# Patient Record
Sex: FEMALE | Race: WHITE
[De-identification: names, ages, dates, MRNs, and addresses within clinical notes are randomized per-mention and may not be internally consistent; named-entity substitution may affect disease eponyms.]

---

## 2020-02-03 PROBLEM — Z00.00 ENCOUNTER FOR PREVENTIVE HEALTH EXAMINATION: Noted: 2020-02-03

## 2020-02-06 ENCOUNTER — LABORATORY RESULT (OUTPATIENT)
Age: 50
End: 2020-02-06

## 2020-02-06 ENCOUNTER — APPOINTMENT (OUTPATIENT)
Dept: RHEUMATOLOGY | Facility: CLINIC | Age: 50
End: 2020-02-06
Payer: COMMERCIAL

## 2020-02-06 VITALS
WEIGHT: 150 LBS | SYSTOLIC BLOOD PRESSURE: 122 MMHG | HEART RATE: 67 BPM | DIASTOLIC BLOOD PRESSURE: 80 MMHG | OXYGEN SATURATION: 96 % | HEIGHT: 70 IN | BODY MASS INDEX: 21.47 KG/M2 | TEMPERATURE: 98.6 F

## 2020-02-06 DIAGNOSIS — Z78.9 OTHER SPECIFIED HEALTH STATUS: ICD-10-CM

## 2020-02-06 DIAGNOSIS — M25.541 PAIN IN JOINTS OF RIGHT HAND: ICD-10-CM

## 2020-02-06 DIAGNOSIS — Z87.891 PERSONAL HISTORY OF NICOTINE DEPENDENCE: ICD-10-CM

## 2020-02-06 DIAGNOSIS — M25.539 PAIN IN UNSPECIFIED WRIST: ICD-10-CM

## 2020-02-06 DIAGNOSIS — K11.7 DISTURBANCES OF SALIVARY SECRETION: ICD-10-CM

## 2020-02-06 DIAGNOSIS — E50.7 OTHER OCULAR MANIFESTATIONS OF VITAMIN A DEFICIENCY: ICD-10-CM

## 2020-02-06 DIAGNOSIS — M25.569 PAIN IN UNSPECIFIED KNEE: ICD-10-CM

## 2020-02-06 DIAGNOSIS — M54.2 CERVICALGIA: ICD-10-CM

## 2020-02-06 DIAGNOSIS — M25.579 PAIN IN UNSPECIFIED ANKLE AND JOINTS OF UNSPECIFIED FOOT: ICD-10-CM

## 2020-02-06 DIAGNOSIS — Z87.19 PERSONAL HISTORY OF OTHER DISEASES OF THE DIGESTIVE SYSTEM: ICD-10-CM

## 2020-02-06 DIAGNOSIS — K59.00 CONSTIPATION, UNSPECIFIED: ICD-10-CM

## 2020-02-06 DIAGNOSIS — M54.9 DORSALGIA, UNSPECIFIED: ICD-10-CM

## 2020-02-06 DIAGNOSIS — G62.9 POLYNEUROPATHY, UNSPECIFIED: ICD-10-CM

## 2020-02-06 DIAGNOSIS — M25.60 STIFFNESS OF UNSPECIFIED JOINT, NOT ELSEWHERE CLASSIFIED: ICD-10-CM

## 2020-02-06 DIAGNOSIS — M25.542 PAIN IN JOINTS OF RIGHT HAND: ICD-10-CM

## 2020-02-06 DIAGNOSIS — I73.00 RAYNAUD'S SYNDROME W/OUT GANGRENE: ICD-10-CM

## 2020-02-06 DIAGNOSIS — M25.559 PAIN IN UNSPECIFIED HIP: ICD-10-CM

## 2020-02-06 PROCEDURE — 99205 OFFICE O/P NEW HI 60 MIN: CPT | Mod: 25

## 2020-02-06 PROCEDURE — 36415 COLL VENOUS BLD VENIPUNCTURE: CPT

## 2020-02-06 RX ORDER — BRIMONIDINE TARTRATE, TIMOLOL MALEATE 2; 5 MG/ML; MG/ML
SOLUTION/ DROPS OPHTHALMIC
Refills: 0 | Status: ACTIVE | COMMUNITY

## 2020-02-06 RX ORDER — LATANOPROST/PF 0.005 %
DROPS OPHTHALMIC (EYE)
Refills: 0 | Status: ACTIVE | COMMUNITY

## 2020-03-06 LAB
24R-OH-CALCIDIOL SERPL-MCNC: 81.6 PG/ML
25(OH)D3 SERPL-MCNC: 25 NG/ML
ACE BLD-CCNC: 38 U/L
ALP BONE SERPL-MCNC: 9 MCG/L
CA-I SERPL-SCNC: 1.25 MMOL/L
CHROMATIN AB SERPL-ACNC: <0.2 AL
EJ AB SER QL: NEGATIVE
ENA JO1 AB SER IA-ACNC: <0.2 AL
ENA JO1 AB SER IA-ACNC: <20 UNITS
ENA PM/SCL AB SER-ACNC: <20 UNITS
ENA SM+RNP AB SER IA-ACNC: <20 UNITS
ENA SS-A IGG SER QL: <20 UNITS
FIBRILLARIN AB SER QL: NEGATIVE
IL2 SERPL-MCNC: <31.2 PG/ML
IL6 SERPL-MCNC: <0.7 PG/ML
INTERLEUKIN 1-BETA: < 3.9 PG/ML
KU AB SER QL: NEGATIVE
LDH SERPL-CCNC: 167 U/L
LUPUS ANTICOAGULANT CASCADE REFLEX: NORMAL
MDA-5 (P140)(CADM-140): <20 UNITS
MI2 AB SER QL: NEGATIVE
MISCELLANEOUS TEST: NORMAL
NXP-2 (P140): <20 UNITS
OJ AB SER QL: NEGATIVE
PL12 AB SER QL: NEGATIVE
PL7 AB SER QL: NEGATIVE
PROC NAME: NORMAL
SRP AB SERPL QL: NEGATIVE
TIF GAMMA (P155/140): <20 UNITS
U2 SNRNP AB SER QL: NEGATIVE

## 2020-10-12 ENCOUNTER — APPOINTMENT (OUTPATIENT)
Dept: NEUROLOGY | Facility: CLINIC | Age: 50
End: 2020-10-12
Payer: COMMERCIAL

## 2020-10-12 VITALS
HEART RATE: 68 BPM | TEMPERATURE: 98.2 F | SYSTOLIC BLOOD PRESSURE: 100 MMHG | WEIGHT: 145 LBS | DIASTOLIC BLOOD PRESSURE: 66 MMHG | BODY MASS INDEX: 20.76 KG/M2 | HEIGHT: 70 IN

## 2020-10-12 DIAGNOSIS — R20.0 ANESTHESIA OF SKIN: ICD-10-CM

## 2020-10-12 DIAGNOSIS — R20.2 ANESTHESIA OF SKIN: ICD-10-CM

## 2020-10-12 DIAGNOSIS — Z98.1 ARTHRODESIS STATUS: ICD-10-CM

## 2020-10-12 DIAGNOSIS — G60.9 HEREDITARY AND IDIOPATHIC NEUROPATHY, UNSPECIFIED: ICD-10-CM

## 2020-10-12 PROCEDURE — 99204 OFFICE O/P NEW MOD 45 MIN: CPT

## 2020-10-12 RX ORDER — BRINZOLAMIDE 10 MG/ML
1 SUSPENSION/ DROPS OPHTHALMIC
Refills: 0 | Status: ACTIVE | COMMUNITY

## 2020-10-12 NOTE — HISTORY OF PRESENT ILLNESS
[FreeTextEntry1] : She is a 50-year-old patient coming here for evaluation for bilateral upper and lower extremity paresthesias numbness with the diagnosis of idiopathic peripheral neuropathy under care of Dr. Ferguson in [place] [facility] until this year had extensive workup including MRI scan brain, cervical spine, EMG/NCV studies, small nerve fiber skin biopsy, per patient all of them were negative except EMG/NCV study reported bilateral carpal syndrome. No outpatient records available except labwork positive for free K+L light chains positive subsequently seen by hematology, rheumatologists.\par Patient tried on gabapentin blood does not want to continue medication as patient and afraid of getting addicted to the medication. Persistent symptoms of neuropathy and bilateral leg cramps each bothering her currently.\par Patient exercises regularly and walks on a daily basis. Mild imbalance noted. No focal weakness. Denies of any other complaints except for Raynaud's phenomena dry mouth and history of glaucoma. Seen by Dr. Tucker and recommended neurology opinion.

## 2020-10-12 NOTE — DISCUSSION/SUMMARY
[FreeTextEntry1] : She is 50-year-old patient with the symptoms of sensory peripheral neuropathy no reports available for evaluation except for lab work.\par Patient being followed by rheumatology and outside neurologist extensive workup done including MRI scans and EMG/NCV studies x2 including skin biopsy. Clinical examination suggests peripheral neuropathy.\par Patient does not want any repeat testing at this time.\par Discussed with patient symptomatic treatment with gabapentin which patient is willing to try at this time.\par Follow up with  rheumatology as needed.\par Followup with Dr. Ferguson or return for followup evaluation here if you decide to follow up in this office.\par Get copies of medical records from Dr. Ferguson 's office.\par Followup with you for other medical problems.

## 2020-10-12 NOTE — PHYSICAL EXAM
[General Appearance - Alert] : alert [General Appearance - In No Acute Distress] : in no acute distress [Oriented To Time, Place, And Person] : oriented to person, place, and time [Impaired Insight] : insight and judgment were intact [Affect] : the affect was normal [Person] : oriented to person [Place] : oriented to place [Time] : oriented to time [Concentration Intact] : normal concentrating ability [Visual Intact] : visual attention was ~T not ~L decreased [Naming Objects] : no difficulty naming common objects [Repeating Phrases] : no difficulty repeating a phrase [Writing A Sentence] : no difficulty writing a sentence [Fluency] : fluency intact [Comprehension] : comprehension intact [Reading] : reading intact [Past History] : adequate knowledge of personal past history [Cranial Nerves Optic (II)] : visual acuity intact bilaterally,  visual fields full to confrontation, pupils equal round and reactive to light [Cranial Nerves Oculomotor (III)] : extraocular motion intact [Cranial Nerves Trigeminal (V)] : facial sensation intact symmetrically [Cranial Nerves Facial (VII)] : face symmetrical [Cranial Nerves Vestibulocochlear (VIII)] : hearing was intact bilaterally [Cranial Nerves Glossopharyngeal (IX)] : tongue and palate midline [Cranial Nerves Accessory (XI - Cranial And Spinal)] : head turning and shoulder shrug symmetric [Cranial Nerves Hypoglossal (XII)] : there was no tongue deviation with protrusion [Motor Strength] : muscle strength was normal in all four extremities [No Muscle Atrophy] : normal bulk in all four extremities [Romberg's Sign] : a positive Romberg's sign was present [Limited Balance] : the patient's balance was impaired [Past-pointing] : there was no past-pointing [Tremor] : no tremor present [1+] : Triceps left 1+ [0] : Ankle jerk left 0 [Plantar Reflex Right Only] : normal on the right [Plantar Reflex Left Only] : normal on the left [FreeTextEntry7] : Distal sensory loss with Vibration [FreeTextEntry8] : Mild unsteady on tandem walking [Sclera] : the sclera and conjunctiva were normal [PERRL With Normal Accommodation] : pupils were equal in size, round, reactive to light, with normal accommodation [Extraocular Movements] : extraocular movements were intact [Outer Ear] : the ears and nose were normal in appearance [Oropharynx] : the oropharynx was normal [Neck Appearance] : the appearance of the neck was normal [Neck Cervical Mass (___cm)] : no neck mass was observed [Jugular Venous Distention Increased] : there was no jugular-venous distention [Thyroid Diffuse Enlargement] : the thyroid was not enlarged [Thyroid Nodule] : there were no palpable thyroid nodules [Auscultation Breath Sounds / Voice Sounds] : lungs were clear to auscultation bilaterally [Heart Rate And Rhythm] : heart rate was normal and rhythm regular [Heart Sounds] : normal S1 and S2 [Heart Sounds Gallop] : no gallops [Murmurs] : no murmurs [Heart Sounds Pericardial Friction Rub] : no pericardial rub [Full Pulse] : the pedal pulses are present [Edema] : there was no peripheral edema [Bowel Sounds] : normal bowel sounds [Abdomen Soft] : soft [Abdomen Tenderness] : non-tender [Abdomen Mass (___ Cm)] : no abdominal mass palpated [No CVA Tenderness] : no ~M costovertebral angle tenderness [No Spinal Tenderness] : no spinal tenderness [Nail Clubbing] : no clubbing  or cyanosis of the fingernails [Musculoskeletal - Swelling] : no joint swelling seen [Motor Tone] : muscle strength and tone were normal [FreeTextEntry1] : Mild unsteady [Skin Color & Pigmentation] : normal skin color and pigmentation [Skin Turgor] : normal skin turgor [] : no rash

## 2020-10-12 NOTE — REVIEW OF SYSTEMS
[Numbness] : numbness [Tingling] : tingling [Eyesight Problems] : eyesight problems [Dry Eyes] : dryness of the eyes [Constipation] : constipation [Negative] : Heme/Lymph [de-identified] : Muscle cramps

## 2020-10-12 NOTE — REASON FOR VISIT
[Consultation] : a consultation visit [FreeTextEntry1] : Evaluation for Neuropathy with Bilateral UE/ LE paresthesias and numbness with  random shocks through out the body with extensive testing done by Dr. Ferguson at [facility]

## 2021-09-14 ENCOUNTER — APPOINTMENT (OUTPATIENT)
Dept: OBGYN | Facility: CLINIC | Age: 51
End: 2021-09-14
Payer: COMMERCIAL

## 2021-09-14 ENCOUNTER — NON-APPOINTMENT (OUTPATIENT)
Age: 51
End: 2021-09-14

## 2021-09-14 ENCOUNTER — APPOINTMENT (OUTPATIENT)
Dept: OBGYN | Facility: CLINIC | Age: 51
End: 2021-09-14

## 2021-09-14 VITALS
DIASTOLIC BLOOD PRESSURE: 64 MMHG | WEIGHT: 145 LBS | HEIGHT: 70 IN | SYSTOLIC BLOOD PRESSURE: 116 MMHG | BODY MASS INDEX: 20.76 KG/M2

## 2021-09-14 DIAGNOSIS — Z82.49 FAMILY HISTORY OF ISCHEMIC HEART DISEASE AND OTHER DISEASES OF THE CIRCULATORY SYSTEM: ICD-10-CM

## 2021-09-14 DIAGNOSIS — Z86.018 PERSONAL HISTORY OF OTHER BENIGN NEOPLASM: ICD-10-CM

## 2021-09-14 DIAGNOSIS — Z83.3 FAMILY HISTORY OF DIABETES MELLITUS: ICD-10-CM

## 2021-09-14 DIAGNOSIS — Z85.828 PERSONAL HISTORY OF OTHER MALIGNANT NEOPLASM OF SKIN: ICD-10-CM

## 2021-09-14 DIAGNOSIS — N95.1 MENOPAUSAL AND FEMALE CLIMACTERIC STATES: ICD-10-CM

## 2021-09-14 DIAGNOSIS — Z00.00 ENCOUNTER FOR GENERAL ADULT MEDICAL EXAMINATION W/OUT ABNORMAL FINDINGS: ICD-10-CM

## 2021-09-14 DIAGNOSIS — Z80.0 FAMILY HISTORY OF MALIGNANT NEOPLASM OF DIGESTIVE ORGANS: ICD-10-CM

## 2021-09-14 DIAGNOSIS — Z72.89 OTHER PROBLEMS RELATED TO LIFESTYLE: ICD-10-CM

## 2021-09-14 DIAGNOSIS — Z86.69 PERSONAL HISTORY OF OTHER DISEASES OF THE NERVOUS SYSTEM AND SENSE ORGANS: ICD-10-CM

## 2021-09-14 PROCEDURE — 99212 OFFICE O/P EST SF 10 MIN: CPT | Mod: 25

## 2021-09-14 PROCEDURE — 99386 PREV VISIT NEW AGE 40-64: CPT

## 2021-09-14 RX ORDER — GABAPENTIN 100 MG/1
100 CAPSULE ORAL
Refills: 0 | Status: ACTIVE | COMMUNITY

## 2021-09-14 RX ORDER — ERGOCALCIFEROL 1.25 MG/1
1.25 MG CAPSULE, LIQUID FILLED ORAL
Refills: 0 | Status: ACTIVE | COMMUNITY

## 2021-09-14 RX ORDER — BACILLUS COAGULANS/INULIN 1B-250 MG
CAPSULE ORAL
Refills: 0 | Status: ACTIVE | COMMUNITY

## 2021-09-21 ENCOUNTER — APPOINTMENT (OUTPATIENT)
Dept: OBGYN | Facility: CLINIC | Age: 51
End: 2021-09-21

## 2021-11-03 NOTE — PHYSICAL EXAM
[Appropriately responsive] : appropriately responsive [Alert] : alert [No Acute Distress] : no acute distress [No Lymphadenopathy] : no lymphadenopathy [Regular Rate Rhythm] : regular rate rhythm [No Murmurs] : no murmurs [Clear to Auscultation B/L] : clear to auscultation bilaterally [Soft] : soft [Non-tender] : non-tender [Non-distended] : non-distended [No HSM] : No HSM [No Lesions] : no lesions [No Mass] : no mass [Oriented x3] : oriented x3 [Examination Of The Breasts] : a normal appearance [No Masses] : no breast masses were palpable [Labia Majora] : normal [Labia Minora] : normal [Normal] : normal [Absent] : absent [Uterine Adnexae] : normal [Declined] : Patient declined rectal exam [FreeTextEntry4] : cuff intact

## 2021-11-03 NOTE — DISCUSSION/SUMMARY
[FreeTextEntry1] : 49yo G0 s/p MARIANA/BS for fibroids with hot flushes.  She is already Rx'd Gabapentin and will start taking it as Rx'd.  \par \par Hot fluhses: \par - Will take Gabapentin 100mg PO TID (as Rx'd). If increase is needed to 300mg PO daily will d/w Neuro first \par - RTO in 3 months for follow up and re-evaluation \par \par RHM: \par - DVS neg x 3\par - Dentist, PCP, Derm as discussed \par - Rx given for mammo/sono\par - Colonoscopy as discussed \par - Offered STI screen today. Pt declined \par - Encouraged healthy diet, exercise, weight maint. \par \par Arabella Mukherjee MD \par Obstetrician/Gynecologist\par \par \par \par

## 2021-11-03 NOTE — HISTORY OF PRESENT ILLNESS
[Patient reported mammogram was normal] : Patient reported mammogram was normal [Patient reported PAP Smear was abnormal] : Patient reported PAP Smear was abnormal [FreeTextEntry1] : 51yo G0 s/p MARIANA/BS for fibroids here for annual and to discuss her hot flushes. Intermittent for month at at time. Drinks 4-5 glasses of wine weekly but does not note any changes in her hot flushes. No changes with caffeine either. Has tried OTC meds which help intermittently.   [Mammogramdate] : 2019  [PapSmeardate] : 2017  [TextBox_31] : was told it was abnl but then they realized she did not have a cervix  [BoneDensityDate] : N/A [ColonoscopyDate] : N/A

## 2021-12-14 ENCOUNTER — APPOINTMENT (OUTPATIENT)
Dept: OBGYN | Facility: CLINIC | Age: 51
End: 2021-12-14

## 2022-11-08 ENCOUNTER — TRANSCRIPTION ENCOUNTER (OUTPATIENT)
Age: 52
End: 2022-11-08

## 2022-11-08 ENCOUNTER — APPOINTMENT (OUTPATIENT)
Dept: OBGYN | Facility: CLINIC | Age: 52
End: 2022-11-08

## 2022-11-08 ENCOUNTER — LABORATORY RESULT (OUTPATIENT)
Age: 52
End: 2022-11-08

## 2022-11-08 VITALS
BODY MASS INDEX: 21.47 KG/M2 | WEIGHT: 150 LBS | SYSTOLIC BLOOD PRESSURE: 110 MMHG | DIASTOLIC BLOOD PRESSURE: 66 MMHG | HEIGHT: 70 IN

## 2022-11-08 PROCEDURE — 99212 OFFICE O/P EST SF 10 MIN: CPT | Mod: 25

## 2022-11-08 PROCEDURE — 99396 PREV VISIT EST AGE 40-64: CPT

## 2022-11-08 NOTE — HISTORY OF PRESENT ILLNESS
[Patient reported mammogram was normal] : Patient reported mammogram was normal [Patient reported breast sonogram was normal] : Patient reported breast sonogram was normal [Patient reported colonoscopy was normal] : Patient reported colonoscopy was normal [FreeTextEntry1] : 51yo G0 s/p MARIANA/BS for fibroids here for annual. She says her hot flushes remain. She is taking Esequiel 300mg PO qhS  and 100mg in AM because she does not want it to make her sleepy.  \par \par Burning with urination and reports having issues with bladder emptying but has not seen urologist.  [Mammogramdate] : 2021 [BreastSonogramDate] : 2021 [ColonoscopyDate] : 2022

## 2022-11-14 LAB
APPEARANCE: CLEAR
BACTERIA UR CULT: NORMAL
BILIRUBIN URINE: NEGATIVE
BLOOD URINE: NEGATIVE
CANDIDA VAG CYTO: NOT DETECTED
COLOR: COLORLESS
G VAGINALIS+PREV SP MTYP VAG QL MICRO: NOT DETECTED
GLUCOSE QUALITATIVE U: NEGATIVE
KETONES URINE: NEGATIVE
LEUKOCYTE ESTERASE URINE: ABNORMAL
NITRITE URINE: NEGATIVE
PH URINE: 7
PROTEIN URINE: NEGATIVE
SPECIFIC GRAVITY URINE: 1
T VAGINALIS VAG QL WET PREP: NOT DETECTED
URINE CYTOLOGY: NORMAL
UROBILINOGEN URINE: NORMAL

## 2023-02-09 ENCOUNTER — APPOINTMENT (OUTPATIENT)
Dept: UROGYNECOLOGY | Facility: CLINIC | Age: 53
End: 2023-02-09
Payer: COMMERCIAL

## 2023-02-09 VITALS
SYSTOLIC BLOOD PRESSURE: 115 MMHG | DIASTOLIC BLOOD PRESSURE: 70 MMHG | WEIGHT: 150 LBS | BODY MASS INDEX: 21.47 KG/M2 | HEIGHT: 70 IN

## 2023-02-09 DIAGNOSIS — R39.11 HESITANCY OF MICTURITION: ICD-10-CM

## 2023-02-09 DIAGNOSIS — R39.15 URGENCY OF URINATION: ICD-10-CM

## 2023-02-09 DIAGNOSIS — N95.2 POSTMENOPAUSAL ATROPHIC VAGINITIS: ICD-10-CM

## 2023-02-09 DIAGNOSIS — K59.09 OTHER CONSTIPATION: ICD-10-CM

## 2023-02-09 PROCEDURE — 99204 OFFICE O/P NEW MOD 45 MIN: CPT

## 2023-02-09 RX ORDER — ESTRADIOL 0.1 MG/G
0.1 CREAM VAGINAL
Qty: 1 | Refills: 3 | Status: ACTIVE | COMMUNITY
Start: 2023-02-09 | End: 1900-01-01

## 2023-02-09 NOTE — PHYSICAL EXAM
[Chaperone Present] : A chaperone was present in the examining room during all aspects of the physical examination [FreeTextEntry1] : General: Not in acute distress, alert and oriented x3.\par Neck: Supple. No lymphadenopathy. \par Abdomen: Soft, nontender, and nondistended. No obvious hepatosplenomegaly. No obvious hernias. A well-healed small transverse skin incision of previous laparotomy for hysterectomy\par Pelvic Exam: Normal external female genitalia. Saddle sensory exam S2 to S4 is intact. Perineal reflexes not visualized. Urethra is well supported without prolapse, exudates, or lesions. Cough stress test is negative. Post void residual was checked with I/O cath and was 70 cc of clear urine. Pale and atrophic-appearing vaginal epithelium. No vaginal blood.  Yellowish-brown vaginal discharge noted in the vagina.. Cervix and uterus surgically absent. Bimanual exam: No palpable adnexal masses or tenderness.  All vaginal compartments are well supported\par \par

## 2023-02-09 NOTE — DISCUSSION/SUMMARY
[FreeTextEntry1] : The patient was counseled regarding the pathophysiology of the above conditions. A total of approximately 60 minutes was spent on this visit, greater than 50%of which was spent on counseling. She was also counseled regarding the risks, benefits, indications, and alternatives of further evaluations studies, as well as various management options. She was given verbal and written information/education on pelvic floor muscle exercises, avoidance of dietary bladder irritants, and other strategies to improve bladder control.  After a detailed discussion, following management plan was outlined:\par 1. 12 weeks trial of fluid modification and bladder retraining. she will start the home exercise program as instructed.\par 2. UA with micro and urine culture was ordered to exclude UTI.\par 3.  Night time fluid restriction and other strategies to decrease nocturia were reviewed.\par 4. Discussed importance of maintaining optimal glycemic control in for bladder health.\par 5.  Discussed the importance of improving bowel consistency and regularity with bowel retraining and daily intake of fiber supplement.  She verbalized understanding.  \par 6.  Offered her a trial of vaginal estrogen supplementation due to presence of moderate vaginal atrophy.  She has no history of breast cancer and no other contraindication noted.  Prescription for vaginal estrogen cream sent to her pharmacy.  Also discussed use of organic coconut oil as a vaginal moisturizer and lubricant. \par 7.  F/u in  3 months. If she hasn't felt satisfactory improvement with above, will consider further evaluation with urodynamic testing. .

## 2023-02-09 NOTE — ASSESSMENT
[FreeTextEntry1] : Patient is a 52-year-old multipara with urinary hesitancy, weak stream, urinary urgency, chronic constipation and atrophic vaginitis. On examination, there is no evidence of urethral hypermobility with a negative empty bladder supine cough stress test, and normal postvoid residual. She has no history of recurrent UTI. She admits to consumption of few bladder irritants. Other potential contributing factors include history of L5-S1 laminectomy and prediabetes\par \par

## 2023-02-09 NOTE — HISTORY OF PRESENT ILLNESS
[FreeTextEntry1] : Patient is a 52-year-old G0 s/p MARIANA for fibroids who is referred by Dr. Arabella Monique for evaluation and management of voiding difficulty\par Patient reports difficulty emptying of bladder for past 1 year. She reports hesitancy, occasional sensation of incomplete bladder emptying, interrupted urinary stream. She reports pain in lower abdomen/ pelvis while urinating and occasional burning with urination \par Nocturia: 0\par Daytime frequency: q 2 hr\par Hx of bladder infection  2 years ago which h was postcoital \par Daily fluid intake: 64 oz of water (adds cranberry extracts sometimes)-, 24-32 oz of coffee, 8 oz of tea, 8 oz of cranberry/ orange juice \par Bowel symptoms: chronic constipation . Denies fecal incontinence \par \par GYN Hx: MARIANA in 2009 for fibroid. She is sexually inactive.  Denies history of abnormal Pap smears.  Denies history of abnormal mammograms.\par \par PMHx: Peripheral neuropathy, normal pressure glaucoma, impaired glucose tolerance, Raynauds \par \par PSHx: MARIANA/ BS, Laminectomy at L5/ S1\par \par

## 2023-02-10 LAB
APPEARANCE: CLEAR
BACTERIA: NEGATIVE
BILIRUBIN URINE: NEGATIVE
BLOOD URINE: NEGATIVE
COLOR: NORMAL
GLUCOSE QUALITATIVE U: NEGATIVE
HYALINE CASTS: 0 /LPF
KETONES URINE: NEGATIVE
LEUKOCYTE ESTERASE URINE: NEGATIVE
MICROSCOPIC-UA: NORMAL
NITRITE URINE: NEGATIVE
PH URINE: 6
PROTEIN URINE: NEGATIVE
RED BLOOD CELLS URINE: 0 /HPF
SPECIFIC GRAVITY URINE: 1.01
SQUAMOUS EPITHELIAL CELLS: 0 /HPF
UROBILINOGEN URINE: NORMAL
WHITE BLOOD CELLS URINE: 0 /HPF

## 2023-02-13 LAB — BACTERIA UR CULT: NORMAL
